# Patient Record
Sex: MALE | Race: WHITE | NOT HISPANIC OR LATINO | Employment: UNEMPLOYED | URBAN - METROPOLITAN AREA
[De-identification: names, ages, dates, MRNs, and addresses within clinical notes are randomized per-mention and may not be internally consistent; named-entity substitution may affect disease eponyms.]

---

## 2021-03-18 ENCOUNTER — TELEPHONE (OUTPATIENT)
Dept: RADIOLOGY | Facility: HOSPITAL | Age: 56
End: 2021-03-18

## 2021-03-24 ENCOUNTER — TELEPHONE (OUTPATIENT)
Dept: RADIOLOGY | Facility: HOSPITAL | Age: 56
End: 2021-03-24

## 2021-03-26 ENCOUNTER — HOSPITAL ENCOUNTER (OUTPATIENT)
Dept: RADIOLOGY | Facility: HOSPITAL | Age: 56
Discharge: HOME | End: 2021-03-26
Attending: NURSE PRACTITIONER
Payer: COMMERCIAL

## 2021-03-26 DIAGNOSIS — R19.01 ABDOMINAL WALL MASS OF RIGHT UPPER QUADRANT: ICD-10-CM

## 2021-03-26 PROCEDURE — 76705 ECHO EXAM OF ABDOMEN: CPT | Mod: 26,,, | Performed by: RADIOLOGY

## 2021-03-26 PROCEDURE — 76705 US ABDOMEN LIMITED_HERNIA: ICD-10-PCS | Mod: 26,,, | Performed by: RADIOLOGY

## 2021-03-26 PROCEDURE — 76705 ECHO EXAM OF ABDOMEN: CPT | Mod: TC

## 2025-02-07 ENCOUNTER — OFFICE VISIT (OUTPATIENT)
Dept: UROLOGY | Facility: CLINIC | Age: 60
End: 2025-02-07
Payer: COMMERCIAL

## 2025-02-07 ENCOUNTER — LAB VISIT (OUTPATIENT)
Dept: LAB | Facility: HOSPITAL | Age: 60
End: 2025-02-07
Attending: UROLOGY
Payer: COMMERCIAL

## 2025-02-07 VITALS
DIASTOLIC BLOOD PRESSURE: 86 MMHG | HEIGHT: 69 IN | BODY MASS INDEX: 28.41 KG/M2 | HEART RATE: 76 BPM | WEIGHT: 191.81 LBS | SYSTOLIC BLOOD PRESSURE: 144 MMHG

## 2025-02-07 DIAGNOSIS — N40.1 BENIGN PROSTATIC HYPERPLASIA WITH URINARY FREQUENCY: Primary | ICD-10-CM

## 2025-02-07 DIAGNOSIS — E29.1 HYPOGONADISM MALE: ICD-10-CM

## 2025-02-07 DIAGNOSIS — R35.0 BENIGN PROSTATIC HYPERPLASIA WITH URINARY FREQUENCY: ICD-10-CM

## 2025-02-07 DIAGNOSIS — R35.0 BENIGN PROSTATIC HYPERPLASIA WITH URINARY FREQUENCY: Primary | ICD-10-CM

## 2025-02-07 DIAGNOSIS — N40.1 BENIGN PROSTATIC HYPERPLASIA WITH URINARY FREQUENCY: ICD-10-CM

## 2025-02-07 DIAGNOSIS — N52.03 COMBINED ARTERIAL INSUFFICIENCY AND CORPORO-VENOUS OCCLUSIVE ERECTILE DYSFUNCTION: ICD-10-CM

## 2025-02-07 LAB
COMPLEXED PSA SERPL-MCNC: 2.5 NG/ML (ref 0–4)
LH SERPL-ACNC: 2 MIU/ML (ref 0.6–12.1)
PROLACTIN SERPL IA-MCNC: 7.9 NG/ML (ref 3.5–19.4)
TESTOST SERPL-MCNC: 473 NG/DL (ref 304–1227)

## 2025-02-07 PROCEDURE — 99204 OFFICE O/P NEW MOD 45 MIN: CPT | Mod: S$GLB,,, | Performed by: UROLOGY

## 2025-02-07 PROCEDURE — 1160F RVW MEDS BY RX/DR IN RCRD: CPT | Mod: CPTII,S$GLB,, | Performed by: UROLOGY

## 2025-02-07 PROCEDURE — 3008F BODY MASS INDEX DOCD: CPT | Mod: CPTII,S$GLB,, | Performed by: UROLOGY

## 2025-02-07 PROCEDURE — 99999 PR PBB SHADOW E&M-NEW PATIENT-LVL III: CPT | Mod: PBBFAC,,, | Performed by: UROLOGY

## 2025-02-07 PROCEDURE — 84403 ASSAY OF TOTAL TESTOSTERONE: CPT | Performed by: UROLOGY

## 2025-02-07 PROCEDURE — 83002 ASSAY OF GONADOTROPIN (LH): CPT | Performed by: UROLOGY

## 2025-02-07 PROCEDURE — 82671 ASSAY OF ESTROGENS: CPT | Performed by: UROLOGY

## 2025-02-07 PROCEDURE — 84146 ASSAY OF PROLACTIN: CPT | Performed by: UROLOGY

## 2025-02-07 PROCEDURE — 1159F MED LIST DOCD IN RCRD: CPT | Mod: CPTII,S$GLB,, | Performed by: UROLOGY

## 2025-02-07 PROCEDURE — 3077F SYST BP >= 140 MM HG: CPT | Mod: CPTII,S$GLB,, | Performed by: UROLOGY

## 2025-02-07 PROCEDURE — 36415 COLL VENOUS BLD VENIPUNCTURE: CPT | Performed by: UROLOGY

## 2025-02-07 PROCEDURE — 84153 ASSAY OF PSA TOTAL: CPT | Performed by: UROLOGY

## 2025-02-07 PROCEDURE — 3079F DIAST BP 80-89 MM HG: CPT | Mod: CPTII,S$GLB,, | Performed by: UROLOGY

## 2025-02-07 RX ORDER — OMEPRAZOLE 20 MG/1
20 TABLET, DELAYED RELEASE ORAL
COMMUNITY

## 2025-02-07 RX ORDER — TAMSULOSIN HYDROCHLORIDE 0.4 MG/1
0.8 CAPSULE ORAL DAILY
Qty: 180 CAPSULE | Refills: 3 | Status: SHIPPED | OUTPATIENT
Start: 2025-02-07

## 2025-02-07 RX ORDER — LEVOTHYROXINE SODIUM 50 UG/1
50 TABLET ORAL
COMMUNITY
Start: 2025-02-06

## 2025-02-07 RX ORDER — SILDENAFIL CITRATE 20 MG/1
20 TABLET ORAL DAILY PRN
Qty: 45 TABLET | Refills: 11 | Status: SHIPPED | OUTPATIENT
Start: 2025-02-07 | End: 2026-02-07

## 2025-02-07 RX ORDER — MELOXICAM 7.5 MG/1
1 TABLET ORAL DAILY PRN
COMMUNITY

## 2025-02-07 RX ORDER — ATORVASTATIN CALCIUM 10 MG/1
10 TABLET, FILM COATED ORAL
COMMUNITY
Start: 2025-02-06

## 2025-02-07 RX ORDER — TAMSULOSIN HYDROCHLORIDE 0.4 MG/1
1 CAPSULE ORAL DAILY
COMMUNITY
Start: 2025-01-31 | End: 2025-02-07 | Stop reason: SDUPTHER

## 2025-02-07 NOTE — PROGRESS NOTES
Chief Complaint:   Encounter Diagnoses   Name Primary?    Benign prostatic hyperplasia with urinary frequency Yes    Hypogonadism male     Combined arterial insufficiency and corporo-venous occlusive erectile dysfunction        HPI:  59-year-old gentleman who comes in with BPH, having been on tamsulosin for the last 2 years.  Patient states that he only gets up 0-1 time per night but he is having some increased frequency and urgency.  Intermittent stream issues, no split stream, no dysuria, no gross hematuria, no microscopic hematuria, no history of smoking.  Eight years ago he passed a stone, none since then, no family history of urological cancers or stones.  Patient does state that he has little bit do decreased libido and energy, he is now having difficulty maintaining and obtaining an erection.  Attempted Cialis 20 mg few years ago and a caused some significant headaches but he is also suffering for some cluster headaches at that time.  Patient states that a lot of these issues came up after having lumbar fusion.  No other urological history.    Allergies:  Patient has no known allergies.    Medications:  See MAR    Review of Systems:  General: No fever, chills, fatigability, or weight loss.  Skin: No rashes, itching, or changes in color or texture of skin.  Chest: Denies RIBERA, cyanosis, wheezing, cough, and sputum production.  Abdomen: Appetite fine. No weight loss. Denies diarrhea, abdominal pain, hematemesis, or blood in stool.  Musculoskeletal: No joint stiffness or swelling. Denies back pain.  : As above.  All other review of systems negative.    PMH:   has a past medical history of Kidney stone.    PSH:   has a past surgical history that includes Appendectomy and Back surgery.    FamHx: family history includes Arthritis in his mother; No Known Problems in his father.    SocHx:  reports that he has never smoked. He has never been exposed to tobacco smoke. He has never used smokeless tobacco. No history on  file for alcohol use and drug use.      Physical Exam:  Vitals:    02/07/25 0751   BP: (!) 144/86   Pulse: 76     General: A&Ox3, no apparent distress, no deformities  Neck: No masses, normal ROM  Lungs: normal inspiration, no use of accessory muscles  Heart: normal pulse, no arrhythmias  Abdomen: Soft, NT, ND, no masses, no hernias, no hepatosplenomegaly  Skin: The skin is warm and dry. No jaundice.  Ext: No c/c/e.  : 2/25- Test desc chadwick, no abnormalities of epididymus. Normal penile and scrotal skin. Meatus normal.    Labs/Studies:   UA normal 2/25    Impression/Plan:     1. BPH- patient has been on tamsulosin now for the last 2 years, we will go ahead and bump this up to 0.8 mg daily.  If this fails to assist then further therapeutic and diagnostic options will be made available.  PSA today and proceed as appropriate.  Call with any other issues prior to the next appointment.      2. Hypogonadism- obtain labs today, patient is interested in shots over cutaneous therapy.  Therefore if low I will bring him in for a nurse visit to teach injections and repeat labs at the next visit.    3. Erectile dysfunction- previous headaches with Cialis, will attempt sildenafil 40 mg.  Patient is aware that he may medically titrate, but not to go over 100 mg, please see below in regards to our discussion today.  See me back in 3 months as stated above.

## 2025-02-12 LAB
ESTRADIOL SERPL HS-MCNC: 15 PG/ML (ref 10–42)
ESTROGEN SERPL CALC-MCNC: 42 PG/ML (ref 19–69)
ESTRONE SERPL-MCNC: 27 PG/ML (ref 9–36)

## 2025-02-21 ENCOUNTER — TELEPHONE (OUTPATIENT)
Dept: UROLOGY | Facility: CLINIC | Age: 60
End: 2025-02-21
Payer: COMMERCIAL

## 2025-02-21 NOTE — TELEPHONE ENCOUNTER
Called patient and let him know that his labs were normal per .      ----- Message from Anthony sent at 2/21/2025  9:07 AM CST -----  Contact: ANGELITO MOCTEZUMA [[id number]]  .Type:  Test ResultsWho Called: ANGELITO MOCTEZUMA [[id number]]Name of Test (Lab/Mammo/Etc): lab Date of Test: 02/07/2025Ordering Provider: Rickie Soto [facility] rather a call back or a response via [facility]? [employer] Call Back Number: [phone] (home) Additional Information:  pt wants a call regarding these results

## 2025-02-21 NOTE — TELEPHONE ENCOUNTER
Called patient and let him know per  that his results are normal.      ----- Message from Anthony sent at 2/21/2025  9:07 AM CST -----  Contact: ANGELITO MOCTEZUMA [[id number]]  .Type:  Test ResultsWho Called: ANGELITO MOCTEZUMA [[id number]]Name of Test (Lab/Mammo/Etc): lab Date of Test: 02/07/2025Ordering Provider: Rickie Soto [facility] patient rather a call back or a response via [facility]? [employer] Call Back Number: [phone] (home) Additional Information:  pt wants a call regarding these results

## 2025-05-09 ENCOUNTER — OFFICE VISIT (OUTPATIENT)
Dept: UROLOGY | Facility: CLINIC | Age: 60
End: 2025-05-09
Payer: COMMERCIAL

## 2025-05-09 VITALS
BODY MASS INDEX: 28.14 KG/M2 | SYSTOLIC BLOOD PRESSURE: 127 MMHG | HEART RATE: 70 BPM | HEIGHT: 69 IN | WEIGHT: 190 LBS | DIASTOLIC BLOOD PRESSURE: 85 MMHG

## 2025-05-09 DIAGNOSIS — N52.03 COMBINED ARTERIAL INSUFFICIENCY AND CORPORO-VENOUS OCCLUSIVE ERECTILE DYSFUNCTION: ICD-10-CM

## 2025-05-09 DIAGNOSIS — N40.1 BENIGN PROSTATIC HYPERPLASIA WITH URINARY FREQUENCY: Primary | ICD-10-CM

## 2025-05-09 DIAGNOSIS — R35.0 BENIGN PROSTATIC HYPERPLASIA WITH URINARY FREQUENCY: Primary | ICD-10-CM

## 2025-05-09 DIAGNOSIS — E29.1 HYPOGONADISM MALE: ICD-10-CM

## 2025-05-09 PROCEDURE — 99999 PR PBB SHADOW E&M-EST. PATIENT-LVL III: CPT | Mod: PBBFAC,,, | Performed by: UROLOGY

## 2025-05-09 NOTE — PROGRESS NOTES
Chief Complaint:   Encounter Diagnoses   Name Primary?    Benign prostatic hyperplasia with urinary frequency Yes    Hypogonadism male     Combined arterial insufficiency and corporo-venous occlusive erectile dysfunction        HPI:    5/9/25- total testosterone was normal, still hypo gonadal symptoms.  80 mg of Viagra not assisting but he may not have taken this on an empty stomach.  Voiding is relatively unchanged on b.i.d. tamsulosin.    59-year-old gentleman who comes in with BPH, having been on tamsulosin for the last 2 years.  Patient states that he only gets up 0-1 time per night but he is having some increased frequency and urgency.  Intermittent stream issues, no split stream, no dysuria, no gross hematuria, no microscopic hematuria, no history of smoking.  Eight years ago he passed a stone, none since then, no family history of urological cancers or stones.  Patient does state that he has little bit do decreased libido and energy, he is now having difficulty maintaining and obtaining an erection.  Attempted Cialis 20 mg few years ago and a caused some significant headaches but he is also suffering for some cluster headaches at that time.  Patient states that a lot of these issues came up after having lumbar fusion.  No other urological history.    Allergies:  Patient has no known allergies.    Medications:  See MAR    Review of Systems:  General: No fever, chills, fatigability, or weight loss.  Skin: No rashes, itching, or changes in color or texture of skin.  Chest: Denies RIBERA, cyanosis, wheezing, cough, and sputum production.  Abdomen: Appetite fine. No weight loss. Denies diarrhea, abdominal pain, hematemesis, or blood in stool.  Musculoskeletal: No joint stiffness or swelling. Denies back pain.  : As above.  All other review of systems negative.    PMH:   has a past medical history of Kidney stone.    PSH:   has a past surgical history that includes Appendectomy and Back surgery.    FamHx: family  history includes Arthritis in his mother; No Known Problems in his father.    SocHx:  reports that he has never smoked. He has never been exposed to tobacco smoke. He has never used smokeless tobacco. No history on file for alcohol use and drug use.      Physical Exam:  Vitals:    05/09/25 1105   BP: 127/85   Pulse: 70     General: A&Ox3, no apparent distress, no deformities  Neck: No masses, normal ROM  Lungs: normal inspiration, no use of accessory muscles  Heart: normal pulse, no arrhythmias  Abdomen: Soft, NT, ND, no masses, no hernias, no hepatosplenomegaly  Skin: The skin is warm and dry. No jaundice.  Ext: No c/c/e.  : 2/25- Test desc chadwick, no abnormalities of epididymus. Normal penile and scrotal skin. Meatus normal.    Labs/Studies:   UA normal 2/25  PSA 2.5 2/25  Testosterone 473 2/25  Estrogen 42 2/25    Impression/Plan:        1. BPH- he has been on tamsulosin for years, he increased to b.i.d. with no significant change.  Does not want to pursue dutasteride or surgery at this juncture and will continue to monitor for now.  Continue to monitor PSA levels as well.  He will add saw palmetto at this time.    2. Hypogonadism- testosterone is normal but will attempt a free to total assessment.  If low patient prefers shots over cutaneous therapy.  Reassess in 3 months, call with any other issues in the meantime.    3. Erectile dysfunction- sildenafil 80 mg really did not assist but he was not taking it on empty stomach.  Therefore he will attempt 100 mg.  Of note previous headaches with Cialis.  Patient is actively attempting to modify his diet and exercise.

## 2025-08-01 ENCOUNTER — OFFICE VISIT (OUTPATIENT)
Dept: UROLOGY | Facility: CLINIC | Age: 60
End: 2025-08-01
Payer: COMMERCIAL

## 2025-08-01 VITALS
DIASTOLIC BLOOD PRESSURE: 76 MMHG | HEART RATE: 80 BPM | WEIGHT: 191.56 LBS | BODY MASS INDEX: 28.37 KG/M2 | SYSTOLIC BLOOD PRESSURE: 116 MMHG | HEIGHT: 69 IN

## 2025-08-01 DIAGNOSIS — R35.0 BENIGN PROSTATIC HYPERPLASIA WITH URINARY FREQUENCY: Primary | ICD-10-CM

## 2025-08-01 DIAGNOSIS — E29.1 HYPOGONADISM MALE: ICD-10-CM

## 2025-08-01 DIAGNOSIS — N40.1 BENIGN PROSTATIC HYPERPLASIA WITH URINARY FREQUENCY: Primary | ICD-10-CM

## 2025-08-01 DIAGNOSIS — N52.03 COMBINED ARTERIAL INSUFFICIENCY AND CORPORO-VENOUS OCCLUSIVE ERECTILE DYSFUNCTION: ICD-10-CM

## 2025-08-01 PROCEDURE — 99999 PR PBB SHADOW E&M-EST. PATIENT-LVL III: CPT | Mod: PBBFAC,,, | Performed by: UROLOGY

## 2025-08-01 NOTE — PROGRESS NOTES
Chief Complaint:   Encounter Diagnoses   Name Primary?    Benign prostatic hyperplasia with urinary frequency Yes    Hypogonadism male     Combined arterial insufficiency and corporo-venous occlusive erectile dysfunction        HPI:    8/1/25- patient has tried the Viagra, it worked well.  Voiding is unchanged, testosterone free to total has been assessed and still within normal range.    59-year-old gentleman who comes in with BPH, having been on tamsulosin for the last 2 years.  Patient states that he only gets up 0-1 time per night but he is having some increased frequency and urgency.  Intermittent stream issues, no split stream, no dysuria, no gross hematuria, no microscopic hematuria, no history of smoking.  Eight years ago he passed a stone, none since then, no family history of urological cancers or stones.  Patient does state that he has little bit do decreased libido and energy, he is now having difficulty maintaining and obtaining an erection.  Attempted Cialis 20 mg few years ago and a caused some significant headaches but he is also suffering for some cluster headaches at that time.  Patient states that a lot of these issues came up after having lumbar fusion.  No other urological history.    Allergies:  Patient has no known allergies.    Medications:  See MAR    Review of Systems:  General: No fever, chills, fatigability, or weight loss.  Skin: No rashes, itching, or changes in color or texture of skin.  Chest: Denies RIBERA, cyanosis, wheezing, cough, and sputum production.  Abdomen: Appetite fine. No weight loss. Denies diarrhea, abdominal pain, hematemesis, or blood in stool.  Musculoskeletal: No joint stiffness or swelling. Denies back pain.  : As above.  All other review of systems negative.    PMH:   has a past medical history of Kidney stone.    PSH:   has a past surgical history that includes Appendectomy and Back surgery.    FamHx: family history includes Arthritis in his mother; No Known  Problems in his father.    SocHx:  reports that he has never smoked. He has never been exposed to tobacco smoke. He has never used smokeless tobacco. No history on file for alcohol use and drug use.      Physical Exam:  There were no vitals filed for this visit.    General: A&Ox3, no apparent distress, no deformities  Neck: No masses, normal ROM  Lungs: normal inspiration, no use of accessory muscles  Heart: normal pulse, no arrhythmias  Abdomen: Soft, NT, ND, no masses, no hernias, no hepatosplenomegaly  Skin: The skin is warm and dry. No jaundice.  Ext: No c/c/e.  : 2/25- Test desc chadwick, no abnormalities of epididymus. Normal penile and scrotal skin. Meatus normal.    Labs/Studies:   UA normal 8/25  PSA 2.5 2/25  Testosterone 473 2/25  Estrogen 42 2/25    Impression/Plan:        1. BPH- tamsulosin b.i.d. relatively controls his symptoms, he does not want to pursue dutasteride or surgery.  Call with any issues, otherwise see me in 6 months with a PSA.  If stable yearly from there.    2. Hypogonadism- testosterone both free and total have again been checked and are within normal range, despite possible clinical symptoms.    3. Erectile dysfunction- sildenafil 80 mg assists and he will continue for now.  Of note previous headaches with Cialis.  Patient is attempting diet and exercise modifications.